# Patient Record
Sex: FEMALE | Race: AMERICAN INDIAN OR ALASKA NATIVE | NOT HISPANIC OR LATINO | URBAN - METROPOLITAN AREA
[De-identification: names, ages, dates, MRNs, and addresses within clinical notes are randomized per-mention and may not be internally consistent; named-entity substitution may affect disease eponyms.]

---
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
## 2023-02-24 ENCOUNTER — DOCUMENTATION (OUTPATIENT)
Dept: LABOR AND DELIVERY | Facility: HOSPITAL | Age: 18
End: 2023-02-24

## 2023-02-24 DIAGNOSIS — N94.6 DYSMENORRHEA: Primary | ICD-10-CM

## 2023-02-24 RX ORDER — LEVONORGESTREL AND ETHINYL ESTRADIOL 0.15-0.03
1 KIT ORAL DAILY
Qty: 28 TABLET | Refills: 11 | Status: SHIPPED | OUTPATIENT
Start: 2023-02-24 | End: 2024-02-24

## 2023-03-17 ENCOUNTER — DOCUMENTATION (OUTPATIENT)
Dept: LABOR AND DELIVERY | Facility: HOSPITAL | Age: 18
End: 2023-03-17

## 2023-03-17 DIAGNOSIS — N92.0 MENORRHAGIA WITH REGULAR CYCLE: Primary | ICD-10-CM

## 2023-03-17 RX ORDER — NORGESTIMATE AND ETHINYL ESTRADIOL 0.25-0.035
1 KIT ORAL DAILY
Qty: 28 TABLET | Refills: 11 | Status: SHIPPED | OUTPATIENT
Start: 2023-03-17

## 2024-03-04 NOTE — ED PEDIATRIC NURSE NOTE - NS ED NURSE LEVEL OF CONSCIOUSNESS MENTAL STATUS
Awake/Alert/Cooperative

## 2024-03-14 ENCOUNTER — EMERGENCY (EMERGENCY)
Facility: HOSPITAL | Age: 19
LOS: 1 days | Discharge: HOME | End: 2024-03-14
Attending: EMERGENCY MEDICINE
Payer: MEDICAID

## 2024-03-14 VITALS
WEIGHT: 115.08 LBS | RESPIRATION RATE: 18 BRPM | TEMPERATURE: 98 F | HEIGHT: 63 IN | SYSTOLIC BLOOD PRESSURE: 99 MMHG | HEART RATE: 72 BPM | OXYGEN SATURATION: 99 % | DIASTOLIC BLOOD PRESSURE: 64 MMHG

## 2024-03-14 VITALS
HEART RATE: 63 BPM | RESPIRATION RATE: 18 BRPM | SYSTOLIC BLOOD PRESSURE: 100 MMHG | DIASTOLIC BLOOD PRESSURE: 56 MMHG | TEMPERATURE: 98 F | OXYGEN SATURATION: 100 %

## 2024-03-14 LAB
ALBUMIN SERPL ELPH-MCNC: 4.1 G/DL — SIGNIFICANT CHANGE UP (ref 3.5–5)
ALP SERPL-CCNC: 50 U/L — SIGNIFICANT CHANGE UP (ref 40–120)
ALT FLD-CCNC: 18 U/L DA — SIGNIFICANT CHANGE UP (ref 10–60)
ANION GAP SERPL CALC-SCNC: 3 MMOL/L — LOW (ref 5–17)
ANISOCYTOSIS BLD QL: SIGNIFICANT CHANGE UP
APPEARANCE UR: CLEAR — SIGNIFICANT CHANGE UP
AST SERPL-CCNC: 13 U/L — SIGNIFICANT CHANGE UP (ref 10–40)
BASOPHILS # BLD AUTO: 0.05 K/UL — SIGNIFICANT CHANGE UP (ref 0–0.2)
BASOPHILS NFR BLD AUTO: 0.4 % — SIGNIFICANT CHANGE UP (ref 0–2)
BILIRUB SERPL-MCNC: 1.1 MG/DL — SIGNIFICANT CHANGE UP (ref 0.2–1.2)
BILIRUB UR-MCNC: NEGATIVE — SIGNIFICANT CHANGE UP
BUN SERPL-MCNC: 11 MG/DL — SIGNIFICANT CHANGE UP (ref 7–18)
CALCIUM SERPL-MCNC: 8.9 MG/DL — SIGNIFICANT CHANGE UP (ref 8.4–10.5)
CHLORIDE SERPL-SCNC: 106 MMOL/L — SIGNIFICANT CHANGE UP (ref 96–108)
CO2 SERPL-SCNC: 30 MMOL/L — SIGNIFICANT CHANGE UP (ref 22–31)
COLOR SPEC: YELLOW — SIGNIFICANT CHANGE UP
CREAT SERPL-MCNC: 0.58 MG/DL — SIGNIFICANT CHANGE UP (ref 0.5–1.3)
DIFF PNL FLD: NEGATIVE — SIGNIFICANT CHANGE UP
EGFR: 133 ML/MIN/1.73M2 — SIGNIFICANT CHANGE UP
ELLIPTOCYTES BLD QL SMEAR: SLIGHT — SIGNIFICANT CHANGE UP
EOSINOPHIL # BLD AUTO: 0.03 K/UL — SIGNIFICANT CHANGE UP (ref 0–0.5)
EOSINOPHIL NFR BLD AUTO: 0.3 % — SIGNIFICANT CHANGE UP (ref 0–6)
GIANT PLATELETS BLD QL SMEAR: PRESENT — SIGNIFICANT CHANGE UP
GLUCOSE SERPL-MCNC: 100 MG/DL — HIGH (ref 70–99)
GLUCOSE UR QL: NEGATIVE MG/DL — SIGNIFICANT CHANGE UP
HCG SERPL-ACNC: <1 MIU/ML — SIGNIFICANT CHANGE UP
HCT VFR BLD CALC: 33.9 % — LOW (ref 34.5–45)
HGB BLD-MCNC: 10.4 G/DL — LOW (ref 11.5–15.5)
IMM GRANULOCYTES NFR BLD AUTO: 0.4 % — SIGNIFICANT CHANGE UP (ref 0–0.9)
KETONES UR-MCNC: NEGATIVE MG/DL — SIGNIFICANT CHANGE UP
LEUKOCYTE ESTERASE UR-ACNC: NEGATIVE — SIGNIFICANT CHANGE UP
LG PLATELETS BLD QL AUTO: SLIGHT — SIGNIFICANT CHANGE UP
LIDOCAIN IGE QN: 33 U/L — SIGNIFICANT CHANGE UP (ref 13–75)
LYMPHOCYTES # BLD AUTO: 2.4 K/UL — SIGNIFICANT CHANGE UP (ref 1–3.3)
LYMPHOCYTES # BLD AUTO: 20.1 % — SIGNIFICANT CHANGE UP (ref 13–44)
MANUAL SMEAR VERIFICATION: SIGNIFICANT CHANGE UP
MCHC RBC-ENTMCNC: 19.5 PG — LOW (ref 27–34)
MCHC RBC-ENTMCNC: 30.7 GM/DL — LOW (ref 32–36)
MCV RBC AUTO: 63.6 FL — LOW (ref 80–100)
MICROCYTES BLD QL: SIGNIFICANT CHANGE UP
MONOCYTES # BLD AUTO: 0.59 K/UL — SIGNIFICANT CHANGE UP (ref 0–0.9)
MONOCYTES NFR BLD AUTO: 4.9 % — SIGNIFICANT CHANGE UP (ref 2–14)
NEUTROPHILS # BLD AUTO: 8.84 K/UL — HIGH (ref 1.8–7.4)
NEUTROPHILS NFR BLD AUTO: 73.9 % — SIGNIFICANT CHANGE UP (ref 43–77)
NITRITE UR-MCNC: NEGATIVE — SIGNIFICANT CHANGE UP
NRBC # BLD: 0 /100 WBCS — SIGNIFICANT CHANGE UP (ref 0–0)
OVALOCYTES BLD QL SMEAR: SLIGHT — SIGNIFICANT CHANGE UP
PH UR: 8.5 (ref 5–8)
PLAT MORPH BLD: NORMAL — SIGNIFICANT CHANGE UP
PLATELET # BLD AUTO: 181 K/UL — SIGNIFICANT CHANGE UP (ref 150–400)
POIKILOCYTOSIS BLD QL AUTO: SIGNIFICANT CHANGE UP
POTASSIUM SERPL-MCNC: 3.3 MMOL/L — LOW (ref 3.5–5.3)
POTASSIUM SERPL-SCNC: 3.3 MMOL/L — LOW (ref 3.5–5.3)
PROT SERPL-MCNC: 7.7 G/DL — SIGNIFICANT CHANGE UP (ref 6–8.3)
PROT UR-MCNC: NEGATIVE MG/DL — SIGNIFICANT CHANGE UP
RBC # BLD: 5.33 M/UL — HIGH (ref 3.8–5.2)
RBC # FLD: 15.5 % — HIGH (ref 10.3–14.5)
RBC BLD AUTO: ABNORMAL
SCHISTOCYTES BLD QL AUTO: SLIGHT — SIGNIFICANT CHANGE UP
SODIUM SERPL-SCNC: 139 MMOL/L — SIGNIFICANT CHANGE UP (ref 135–145)
SP GR SPEC: 1.01 — SIGNIFICANT CHANGE UP (ref 1–1.03)
TROPONIN I, HIGH SENSITIVITY RESULT: <3 NG/L — SIGNIFICANT CHANGE UP
UROBILINOGEN FLD QL: 0.2 MG/DL — SIGNIFICANT CHANGE UP (ref 0.2–1)
WBC # BLD: 11.96 K/UL — HIGH (ref 3.8–10.5)
WBC # FLD AUTO: 11.96 K/UL — HIGH (ref 3.8–10.5)

## 2024-03-14 PROCEDURE — 84702 CHORIONIC GONADOTROPIN TEST: CPT

## 2024-03-14 PROCEDURE — 83690 ASSAY OF LIPASE: CPT

## 2024-03-14 PROCEDURE — 84484 ASSAY OF TROPONIN QUANT: CPT

## 2024-03-14 PROCEDURE — 80053 COMPREHEN METABOLIC PANEL: CPT

## 2024-03-14 PROCEDURE — 99285 EMERGENCY DEPT VISIT HI MDM: CPT

## 2024-03-14 PROCEDURE — 36415 COLL VENOUS BLD VENIPUNCTURE: CPT

## 2024-03-14 PROCEDURE — 85025 COMPLETE CBC W/AUTO DIFF WBC: CPT

## 2024-03-14 PROCEDURE — 93010 ELECTROCARDIOGRAM REPORT: CPT

## 2024-03-14 PROCEDURE — 93005 ELECTROCARDIOGRAM TRACING: CPT

## 2024-03-14 PROCEDURE — 71045 X-RAY EXAM CHEST 1 VIEW: CPT | Mod: 26

## 2024-03-14 PROCEDURE — 99285 EMERGENCY DEPT VISIT HI MDM: CPT | Mod: 25

## 2024-03-14 PROCEDURE — 71045 X-RAY EXAM CHEST 1 VIEW: CPT

## 2024-03-14 PROCEDURE — 96375 TX/PRO/DX INJ NEW DRUG ADDON: CPT

## 2024-03-14 PROCEDURE — 81003 URINALYSIS AUTO W/O SCOPE: CPT

## 2024-03-14 PROCEDURE — 96374 THER/PROPH/DIAG INJ IV PUSH: CPT

## 2024-03-14 RX ORDER — ONDANSETRON 8 MG/1
4 TABLET, FILM COATED ORAL ONCE
Refills: 0 | Status: COMPLETED | OUTPATIENT
Start: 2024-03-14 | End: 2024-03-14

## 2024-03-14 RX ORDER — SUCRALFATE 1 G
1 TABLET ORAL ONCE
Refills: 0 | Status: COMPLETED | OUTPATIENT
Start: 2024-03-14 | End: 2024-03-14

## 2024-03-14 RX ORDER — SODIUM CHLORIDE 9 MG/ML
1000 INJECTION INTRAMUSCULAR; INTRAVENOUS; SUBCUTANEOUS ONCE
Refills: 0 | Status: COMPLETED | OUTPATIENT
Start: 2024-03-14 | End: 2024-03-14

## 2024-03-14 RX ORDER — OMEPRAZOLE 10 MG/1
1 CAPSULE, DELAYED RELEASE ORAL
Qty: 14 | Refills: 0
Start: 2024-03-14 | End: 2024-03-27

## 2024-03-14 RX ORDER — ONDANSETRON 8 MG/1
1 TABLET, FILM COATED ORAL
Qty: 6 | Refills: 0
Start: 2024-03-14 | End: 2024-03-15

## 2024-03-14 RX ORDER — FAMOTIDINE 10 MG/ML
20 INJECTION INTRAVENOUS ONCE
Refills: 0 | Status: COMPLETED | OUTPATIENT
Start: 2024-03-14 | End: 2024-03-14

## 2024-03-14 RX ADMIN — Medication 1 GRAM(S): at 14:26

## 2024-03-14 RX ADMIN — ONDANSETRON 4 MILLIGRAM(S): 8 TABLET, FILM COATED ORAL at 13:02

## 2024-03-14 RX ADMIN — FAMOTIDINE 20 MILLIGRAM(S): 10 INJECTION INTRAVENOUS at 13:02

## 2024-03-14 RX ADMIN — SODIUM CHLORIDE 1000 MILLILITER(S): 9 INJECTION INTRAMUSCULAR; INTRAVENOUS; SUBCUTANEOUS at 13:01

## 2024-03-14 RX ADMIN — Medication 30 MILLILITER(S): at 13:02

## 2024-03-14 NOTE — ED PROVIDER NOTE - OBJECTIVE STATEMENT
18-year-old female,  history of ALBARO, presents for evaluation of chest pain, epigastric pain, vomiting since today.  This started while she was at school.  At first epigastric sharp pain and burning sensation.  Followed by vomiting twice NBNB which followed by the midsternal chest tightness and pain.  Pain is continuous.  No alleviating or aggravating factors.   Denies any radiation of pain to the flank area or to the lower abdomen.  Denies any BRBPR or melena history.  No drug or alcohol abuse.  No history of GERD.  No urinary symptoms, vaginal bleeding or any other complaints.

## 2024-03-14 NOTE — ED PROVIDER NOTE - NSFOLLOWUPINSTRUCTIONS_ED_ALL_ED_FT
For the next week avoid any foods that can irritate your stomach: Caffeine, chocolate, mint, citrus, spicy food, fried food, milk.  Follow up with the primary care doctor in 2-3 days.  If you experience any new or worsening symptoms or if you are concerned you can always come back to the emergency for a re-evaluation.  Some results may not be available at the time of your discharge from the hospital. You can download the FOLLOW MY HEALTH silke and have access to these results.  If there were any prescriptions given to you during the visit today take them as prescribed. If you have any questions you can ask the pharmacist.

## 2024-03-14 NOTE — ED ADULT NURSE NOTE - NSFALLUNIVINTERV_ED_ALL_ED
Bed/Stretcher in lowest position, wheels locked, appropriate side rails in place/Call bell, personal items and telephone in reach/Instruct patient to call for assistance before getting out of bed/chair/stretcher/Non-slip footwear applied when patient is off stretcher/[place] to call system/Physically safe environment - no spills, clutter or unnecessary equipment/Purposeful proactive rounding/Room/bathroom lighting operational, light cord in reach

## 2024-03-14 NOTE — ED PROVIDER NOTE - INTERPRETATION
DR. 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 He is on Coumadin for atrial fibrillation. His most recent hemoglobin A1c was 6.4 in July 2018. He is on Zoloft for depression. There are no other complaints at this time. ALLERGIES:   Allergies   Allergen Reactions    Percocet [Oxycodone-Acetaminophen] Itching and Rash     Also causes shaking    Ampicillin Rash       MEDICATIONS: As noted on the [facility] MAR, referenced and incorporated herein.     PAST MEDICAL HISTORY:   Past Medical History:   Diagnosis Date    A-fib ([address] 08/29/2017    new onset    Anemia 4/27/2016    Arthritis     Bacteremia 11/11/2016    Benign prostatic hypertrophy     Chronic kidney disease 10/17/2016    Closed fracture of forearm 8/13/2013    Broken lft forearm     Controlled type 2 diabetes mellitus with chronic kidney disease on chronic dialysis, with long-term current use of insulin ([address]     Decubital ulcer     NON OPEN BUTTOCKS    Dementia     memory problems    Dialysis patient ([address] 1/3/2017    [facility]es Tue, Thurs, [facility]    Difficult intravenous access     HAS NEEDED PICC TEAM IN THE PAST    Difficulty walking     WHEELCHAIR BOUND-PIVOTS WITH ASSIST O F 2    DJD (degenerative joint disease) of knee     Elbow, forearm, and wrist, abrasion or friction burn, without mention of infection 8/13/2013    Abrasions lft forearm     Encephalopathy acute 4/27/2016    Encounter regarding vascular access for dialysis for ESRD ([address] 2/2/2017    ESRD (end stage renal disease) on dialysis ([address] 1/17/2017   
abnormal

## 2024-03-14 NOTE — ED PROVIDER NOTE - PATIENT PORTAL LINK FT
You can access the FollowMyHealth Patient Portal offered by [facility] by registering at the following website: [email or web address]. By joining [employer]’s FollowMyHealth portal, you will also be able to view your health information using other applications (apps) compatible with our system.

## 2024-03-14 NOTE — ED ADULT TRIAGE NOTE - CHIEF COMPLAINT QUOTE
Patient reports epigastric pain radiating to mid-chest associated with nausea and vomiting x2 started 45 mins ago. Accucheck 139. no

## 2024-03-14 NOTE — ED PROVIDER NOTE - CLINICAL SUMMARY MEDICAL DECISION MAKING FREE TEXT BOX
18-year-old female, presents with a chief complaint epigastric pain, chest pain, vomiting today.  Initially patient appears uncomfortable, no signs of distress.  EKG shows sinus bradycardia at 59 bpm without any ischemic or arrhythmic changes.  Abdomen soft, nondistended with mild epigastric tenderness.  Chest x-ray shows no free air or infectious process.  Labs grossly unremarkable.  Trope negative.  Patient felt significant improvement of symptoms after GI cocktail.  Symptoms most likely related to mild esophagitis/GERD/gastritis.  Discussed diet modification, will give Rx for omeprazole/Maalox and patient should follow-up with PMD in 2-3 days.  Discussed red flags to come back to the hospital.

## 2024-03-14 NOTE — ED ADULT NURSE NOTE - CHIEF COMPLAINT QUOTE
Patient reports epigastric pain radiating to mid-chest associated with nausea and vomiting x2 started 45 mins ago. Accucheck 139.

## 2024-04-04 ENCOUNTER — APPOINTMENT (OUTPATIENT)
Dept: OBGYN | Facility: CLINIC | Age: 19
End: 2024-04-04

## 2024-05-23 NOTE — ED ADULT NURSE NOTE - NS ED NOTE ABUSE SUSPICION NEGLECT YN
They insurance will not cover the qvar and it will not cover the pulmicort flexhaler. I left a message with mother that dr briscoe was out and will be back in office on Tuesday.  No

## 2024-11-22 ENCOUNTER — EMERGENCY (EMERGENCY)
Facility: HOSPITAL | Age: 19
LOS: 1 days | Discharge: HOME | End: 2024-11-22
Attending: EMERGENCY MEDICINE | Admitting: EMERGENCY MEDICINE
Payer: MEDICAID

## 2024-11-22 VITALS
DIASTOLIC BLOOD PRESSURE: 66 MMHG | OXYGEN SATURATION: 100 % | HEIGHT: 61 IN | RESPIRATION RATE: 20 BRPM | TEMPERATURE: 98 F | WEIGHT: 102.96 LBS | HEART RATE: 84 BPM | SYSTOLIC BLOOD PRESSURE: 104 MMHG

## 2024-11-22 VITALS
HEART RATE: 70 BPM | TEMPERATURE: 98 F | OXYGEN SATURATION: 98 % | RESPIRATION RATE: 16 BRPM | DIASTOLIC BLOOD PRESSURE: 60 MMHG | SYSTOLIC BLOOD PRESSURE: 98 MMHG

## 2024-11-22 PROBLEM — Z00.00 ENCOUNTER FOR PREVENTIVE HEALTH EXAMINATION: Status: ACTIVE | Noted: 2024-11-22

## 2024-11-22 LAB
ALBUMIN SERPL ELPH-MCNC: 3.6 G/DL — SIGNIFICANT CHANGE UP (ref 3.3–5)
ALP SERPL-CCNC: 36 U/L — SIGNIFICANT CHANGE UP (ref 30–120)
ALT FLD-CCNC: 19 U/L — SIGNIFICANT CHANGE UP (ref 10–60)
ANION GAP SERPL CALC-SCNC: 9 MMOL/L — SIGNIFICANT CHANGE UP (ref 5–17)
ANISOCYTOSIS BLD QL: SLIGHT — SIGNIFICANT CHANGE UP
APPEARANCE UR: ABNORMAL
AST SERPL-CCNC: 12 U/L — SIGNIFICANT CHANGE UP (ref 10–40)
BACTERIA # UR AUTO: NEGATIVE /HPF — SIGNIFICANT CHANGE UP
BASOPHILS # BLD AUTO: 0.03 K/UL — SIGNIFICANT CHANGE UP (ref 0–0.2)
BASOPHILS NFR BLD AUTO: 0.5 % — SIGNIFICANT CHANGE UP (ref 0–2)
BILIRUB SERPL-MCNC: 0.8 MG/DL — SIGNIFICANT CHANGE UP (ref 0.2–1.2)
BILIRUB UR-MCNC: NEGATIVE — SIGNIFICANT CHANGE UP
BUN SERPL-MCNC: 11 MG/DL — SIGNIFICANT CHANGE UP (ref 7–23)
CALCIUM SERPL-MCNC: 9.1 MG/DL — SIGNIFICANT CHANGE UP (ref 8.4–10.5)
CHLORIDE SERPL-SCNC: 106 MMOL/L — SIGNIFICANT CHANGE UP (ref 96–108)
CO2 SERPL-SCNC: 29 MMOL/L — SIGNIFICANT CHANGE UP (ref 22–31)
COLOR SPEC: YELLOW — SIGNIFICANT CHANGE UP
COMMENT - URINE: SIGNIFICANT CHANGE UP
CREAT SERPL-MCNC: 0.58 MG/DL — SIGNIFICANT CHANGE UP (ref 0.5–1.3)
DIFF PNL FLD: ABNORMAL
EGFR: 132 ML/MIN/1.73M2 — SIGNIFICANT CHANGE UP
ELLIPTOCYTES BLD QL SMEAR: SLIGHT — SIGNIFICANT CHANGE UP
EOSINOPHIL # BLD AUTO: 0.06 K/UL — SIGNIFICANT CHANGE UP (ref 0–0.5)
EOSINOPHIL NFR BLD AUTO: 0.9 % — SIGNIFICANT CHANGE UP (ref 0–6)
EPI CELLS # UR: PRESENT
GLUCOSE SERPL-MCNC: 109 MG/DL — HIGH (ref 70–99)
GLUCOSE UR QL: NEGATIVE MG/DL — SIGNIFICANT CHANGE UP
HCG UR QL: NEGATIVE — SIGNIFICANT CHANGE UP
HCT VFR BLD CALC: 30.5 % — LOW (ref 34.5–45)
HGB BLD-MCNC: 9.4 G/DL — LOW (ref 11.5–15.5)
IMM GRANULOCYTES NFR BLD AUTO: 0.3 % — SIGNIFICANT CHANGE UP (ref 0–0.9)
KETONES UR-MCNC: ABNORMAL MG/DL
LEUKOCYTE ESTERASE UR-ACNC: NEGATIVE — SIGNIFICANT CHANGE UP
LIDOCAIN IGE QN: 49 U/L — SIGNIFICANT CHANGE UP (ref 16–77)
LYMPHOCYTES # BLD AUTO: 3.68 K/UL — HIGH (ref 1–3.3)
LYMPHOCYTES # BLD AUTO: 55.8 % — HIGH (ref 13–44)
MCHC RBC-ENTMCNC: 19.6 PG — LOW (ref 27–34)
MCHC RBC-ENTMCNC: 30.8 G/DL — LOW (ref 32–36)
MCV RBC AUTO: 63.5 FL — LOW (ref 80–100)
MICROCYTES BLD QL: SLIGHT — SIGNIFICANT CHANGE UP
MONOCYTES # BLD AUTO: 0.46 K/UL — SIGNIFICANT CHANGE UP (ref 0–0.9)
MONOCYTES NFR BLD AUTO: 7 % — SIGNIFICANT CHANGE UP (ref 2–14)
NEUTROPHILS # BLD AUTO: 2.34 K/UL — SIGNIFICANT CHANGE UP (ref 1.8–7.4)
NEUTROPHILS NFR BLD AUTO: 35.5 % — LOW (ref 43–77)
NITRITE UR-MCNC: NEGATIVE — SIGNIFICANT CHANGE UP
NRBC # BLD: 0 /100 WBCS — SIGNIFICANT CHANGE UP (ref 0–0)
OVALOCYTES BLD QL SMEAR: SLIGHT — SIGNIFICANT CHANGE UP
PH UR: 7.5 — SIGNIFICANT CHANGE UP (ref 5–8)
PLAT MORPH BLD: NORMAL — SIGNIFICANT CHANGE UP
PLATELET # BLD AUTO: 161 K/UL — SIGNIFICANT CHANGE UP (ref 150–400)
PLATELET COUNT - ESTIMATE: NORMAL — SIGNIFICANT CHANGE UP
POIKILOCYTOSIS BLD QL AUTO: SLIGHT — SIGNIFICANT CHANGE UP
POLYCHROMASIA BLD QL SMEAR: SLIGHT — SIGNIFICANT CHANGE UP
POTASSIUM SERPL-MCNC: 3.7 MMOL/L — SIGNIFICANT CHANGE UP (ref 3.5–5.3)
POTASSIUM SERPL-SCNC: 3.7 MMOL/L — SIGNIFICANT CHANGE UP (ref 3.5–5.3)
PROT SERPL-MCNC: 6.8 G/DL — SIGNIFICANT CHANGE UP (ref 6–8.3)
PROT UR-MCNC: NEGATIVE MG/DL — SIGNIFICANT CHANGE UP
RBC # BLD: 4.8 M/UL — SIGNIFICANT CHANGE UP (ref 3.8–5.2)
RBC # FLD: 17.6 % — HIGH (ref 10.3–14.5)
RBC BLD AUTO: ABNORMAL
RBC CASTS # UR COMP ASSIST: 20 /HPF — HIGH (ref 0–4)
SODIUM SERPL-SCNC: 144 MMOL/L — SIGNIFICANT CHANGE UP (ref 135–145)
SP GR SPEC: 1.02 — SIGNIFICANT CHANGE UP (ref 1–1.03)
UROBILINOGEN FLD QL: 1 MG/DL — SIGNIFICANT CHANGE UP (ref 0.2–1)
WBC # BLD: 6.59 K/UL — SIGNIFICANT CHANGE UP (ref 3.8–10.5)
WBC # FLD AUTO: 6.59 K/UL — SIGNIFICANT CHANGE UP (ref 3.8–10.5)
WBC UR QL: 1 /HPF — SIGNIFICANT CHANGE UP (ref 0–5)

## 2024-11-22 RX ORDER — KETOROLAC TROMETHAMINE 30 MG/ML
30 INJECTION INTRAMUSCULAR; INTRAVENOUS ONCE
Refills: 0 | Status: DISCONTINUED | OUTPATIENT
Start: 2024-11-22 | End: 2024-11-22

## 2024-11-22 RX ORDER — SODIUM CHLORIDE 9 MG/ML
1000 INJECTION, SOLUTION INTRAMUSCULAR; INTRAVENOUS; SUBCUTANEOUS ONCE
Refills: 0 | Status: COMPLETED | OUTPATIENT
Start: 2024-11-22 | End: 2024-11-22

## 2024-11-22 RX ORDER — MORPHINE SULFATE 30 MG/1
4 TABLET, EXTENDED RELEASE ORAL ONCE
Refills: 0 | Status: DISCONTINUED | OUTPATIENT
Start: 2024-11-22 | End: 2024-11-22

## 2024-11-22 RX ORDER — ONDANSETRON HYDROCHLORIDE 2 MG/ML
4 INJECTION, SOLUTION INTRAMUSCULAR; INTRAVENOUS ONCE
Refills: 0 | Status: COMPLETED | OUTPATIENT
Start: 2024-11-22 | End: 2024-11-22

## 2024-11-22 RX ADMIN — ONDANSETRON HYDROCHLORIDE 4 MILLIGRAM(S): 2 INJECTION, SOLUTION INTRAMUSCULAR; INTRAVENOUS at 03:36

## 2024-11-22 RX ADMIN — SODIUM CHLORIDE 1000 MILLILITER(S): 9 INJECTION, SOLUTION INTRAMUSCULAR; INTRAVENOUS; SUBCUTANEOUS at 05:00

## 2024-11-22 RX ADMIN — MORPHINE SULFATE 4 MILLIGRAM(S): 30 TABLET, EXTENDED RELEASE ORAL at 04:30

## 2024-11-22 RX ADMIN — KETOROLAC TROMETHAMINE 30 MILLIGRAM(S): 30 INJECTION INTRAMUSCULAR; INTRAVENOUS at 04:56

## 2024-11-22 RX ADMIN — MORPHINE SULFATE 4 MILLIGRAM(S): 30 TABLET, EXTENDED RELEASE ORAL at 03:36

## 2024-11-22 RX ADMIN — SODIUM CHLORIDE 1000 MILLILITER(S): 9 INJECTION, SOLUTION INTRAMUSCULAR; INTRAVENOUS; SUBCUTANEOUS at 03:37

## 2024-11-22 RX ADMIN — KETOROLAC TROMETHAMINE 30 MILLIGRAM(S): 30 INJECTION INTRAMUSCULAR; INTRAVENOUS at 05:30

## 2024-11-22 NOTE — ED ADULT NURSE NOTE - OBJECTIVE STATEMENT
Pt presents to the ED with reports of right sided abdominal pain which began around 4 weeks ago. Pt was seen by GI 4 days ago, had blood test and urine according to pt, was not prescribed any meds. Pt states the pain is worse tonight and she vomited 3 times.

## 2024-11-22 NOTE — ED PROVIDER NOTE - DATE/TIME 1
22-Nov-2024 04:38

## 2024-11-22 NOTE — ED PROVIDER NOTE - CARE PROVIDER_API CALL
Keegan Johnson  Gastroenterology  [address] [place] [id number]  Phone: [phone]  Fax: [phone]  Follow Up Time:

## 2024-11-22 NOTE — ED PROVIDER NOTE - PHYSICAL EXAMINATION
Gen: Alert, NAD  Head/eyes: NC/AT, PERRL  ENT: airway patent  Neck: supple  Pulm/lung: Bilateral clear BS  CV/heart: RRR  GI/Abd: soft, +mild ttp ruq and rlq/ND, +BS, no guarding/rebound tenderness  Musculoskeletal: no edema/erythema/cyanosis  Skin: no rash  Neuro: AAOx3, grossly intact

## 2024-11-22 NOTE — ED PROVIDER NOTE - CLINICAL SUMMARY MEDICAL DECISION MAKING FREE TEXT BOX
19-year-old female no significant past medical history complain about 1 month of right-sided abdominal pain has been intermittent twisting sensation states usually occurs at night but tonight got a lot worse with associated nausea vomiting x 3.  Denies any fever or chills.  No history of abdominal surgeries.    r/o infection, appendicitis, labs, CT a/p, IV analgesia, antiemetics 19-year-old female no significant past medical history complain about 1 month of right-sided upper abdominal pain has been intermittent twisting sensation states usually occurs at night but tonight got a lot worse with associated nausea vomiting x 3.  Denies any fever or chills.  No history of abdominal surgeries.      r/o infection, appendicitis, labs, CT a/p, IV analgesia, antiemetics

## 2024-11-22 NOTE — ED ADULT TRIAGE NOTE - CHIEF COMPLAINT QUOTE
c/o right abd pain x1 month. "Feels like needles stabbing me". Took Ibuprofen twice over the month. Also c/o n/v this past hour. hx severe anemia.

## 2024-11-22 NOTE — ED PROVIDER NOTE - NSFOLLOWUPINSTRUCTIONS_ED_ALL_ED_FT
Return to the ED for any new or worsening symptoms  Take your medication as prescribed  Follow-up with your hematologist and gynecologist as scheduled  If you continue to experience right upper quadrant pain consider follow-up with a gastroenterologist  Advance activity as tolerated      Abdominal Pain    WHAT YOU NEED TO KNOW:    Abdominal pain can be dull, achy, or sharp. You may have pain in one area of your abdomen, or in your entire abdomen. Your pain may be caused by a condition such as constipation, food sensitivity or poisoning, infection, or a blockage. Abdominal pain can also be from a hernia, appendicitis, or an ulcer. Liver, gallbladder, or kidney conditions can also cause abdominal pain. The cause of your abdominal pain may not be known.  Abdominal Organs    DISCHARGE INSTRUCTIONS:    Call your local emergency number (911 in the [place]) if:    You have chest pain or shortness of breath.    Seek care immediately if:    You have pulsing pain in your upper abdomen or lower back that suddenly becomes constant.    Your pain is in the right lower abdominal area and worsens with movement.    You have a fever over 100.4°F (38°C) or shaking chills.    You are vomiting and cannot keep food or liquids down.    Your pain does not improve or gets worse over the next 8 to 12 hours.    You see blood in your vomit or bowel movements, or they look black and tarry.    Your skin or the whites of your eyes turn yellow.    You are a woman and have a large amount of vaginal bleeding that is not your monthly period.  Call your doctor if:    You have pain in your lower back.    You are a man and have pain in your testicles.    You have pain when you urinate.    You have questions or concerns about your condition or care.  Medicines: You may need any of the following:    Medicines may be given to calm your stomach or prevent vomiting.    Prescription pain medicine may be given. Ask your healthcare provider how to take this medicine safely. Some prescription pain medicines contain acetaminophen. Do not take other medicines that contain acetaminophen without talking to your healthcare provider. Too much acetaminophen may cause liver damage. Prescription pain medicine may cause constipation. Ask your healthcare provider how to prevent or treat constipation.    Take your medicine as directed. Contact your healthcare provider if you think your medicine is not helping or if you have side effects. Tell your provider if you are allergic to any medicine. Keep a list of the medicines, vitamins, and herbs you take. Include the amounts, and when and why you take them. Bring the list or the pill bottles to follow-up visits. Carry your medicine list with you in case of an emergency.  Manage or prevent abdominal pain:    Apply heat on your abdomen for 20 to 30 minutes every 2 hours for as many days as directed. Heat helps decrease pain and muscle spasms.    Make changes to the foods you eat, if needed. Do not eat foods that cause abdominal pain or other symptoms. Eat small meals more often. The following changes may also help:  Eat more high-fiber foods if you are constipated. High-fiber foods include fruits, vegetables, whole-grain foods, and legumes such as mosley beans.        Do not eat foods that cause gas if you have bloating. Examples include broccoli, cabbage, beans, and carbonated drinks.    Do not eat foods or drinks that contain sorbitol or fructose if you have diarrhea and bloating. Some examples are fruit juices, candy, jelly, and sugar-free gum.    Do not eat high-fat foods. Examples include fried foods, cheeseburgers, hot dogs, and desserts.    Make changes to the liquids you drink, if needed. Do not drink liquids that cause pain or make it worse, such as orange juice. Drink liquids throughout the day to stay hydrated. The following changes may also help:  Drink more liquids to prevent dehydration from diarrhea or vomiting. Ask your healthcare provider how much liquid to drink each day and which liquids are best for you.    Limit or do not have caffeine. Caffeine may make symptoms such as heartburn or nausea worse.    Limit or do not drink alcohol. Alcohol can make your abdominal pain worse. Ask your healthcare provider if it is okay for you to drink alcohol. Also ask how much is okay for you to drink. A drink of alcohol is 12 ounces of beer, ½ ounce of liquor, or 5 ounces of wine.    Keep a diary of your abdominal pain. A diary may help your healthcare provider learn what is causing your pain. Include when the pain happens, how long it lasts, and what the pain feels like. Write down any other symptoms you have with abdominal pain. Also write down what you eat, and any symptoms you have after you eat.    Manage stress. Stress may cause abdominal pain. Your healthcare provider may recommend relaxation techniques and deep breathing exercises to help decrease your stress. Your healthcare provider may recommend you talk to someone about your stress or anxiety, such as a counselor or a friend. Get plenty of sleep. Exercise regularly.   Family Walking for Exercise      Do not smoke. Nicotine and other chemicals in cigarettes can damage your esophagus and stomach. Ask your healthcare provider for information if you currently smoke and need help to quit. E-cigarettes or smokeless tobacco still contain nicotine. Talk to your healthcare provider before you use these products.  Follow up with your doctor as directed: Write down your questions so you remember to ask them during your visits.

## 2024-11-22 NOTE — ED PROVIDER NOTE - PROGRESS NOTE DETAILS
SUBJ:  Pt. is on BiPAP (alternating with HiFlo NC) 100% FiO2. Denies any active chest pain. Following up for reported ST elevations in leads III, and aVF.    MEDICATIONS  (STANDING):  aspirin enteric coated 81 milliGRAM(s) Oral daily  atorvastatin 80 milliGRAM(s) Oral at bedtime  BACItracin   Ointment 1 Application(s) Topical four times a day  dexAMETHasone  Injectable 6 milliGRAM(s) IV Push daily  diltiazem    Tablet 30 milliGRAM(s) Oral four times a day  heparin  Infusion 900 Unit(s)/Hr (9 mL/Hr) IV Continuous <Continuous>  influenza   Vaccine 0.5 milliLiter(s) IntraMuscular once  lidocaine   Patch 1 Patch Transdermal daily  nafcillin  IVPB 2 Gram(s) IV Intermittent every 4 hours  pantoprazole    Tablet 40 milliGRAM(s) Oral before breakfast  QUEtiapine 50 milliGRAM(s) Oral at bedtime  QUEtiapine 25 milliGRAM(s) Oral at bedtime  sodium chloride 0.9%. 1000 milliLiter(s) (50 mL/Hr) IV Continuous <Continuous>    MEDICATIONS  (PRN):  acetaminophen   Tablet .. 650 milliGRAM(s) Oral every 4 hours PRN Mild Pain (1 - 3)  morphine  - Injectable 1 milliGRAM(s) IV Push every 4 hours PRN Severe Pain (7 - 10)  oxycodone    5 mG/acetaminophen 325 mG 1 Tablet(s) Oral every 4 hours PRN Moderate Pain (4 - 6)  zolpidem 5 milliGRAM(s) Oral at bedtime PRN Insomnia          Vital Signs Last 24 Hrs  T(C): 35.2 (27 Nov 2020 06:00), Max: 36.1 (26 Nov 2020 20:30)  T(F): 95.4 (27 Nov 2020 06:00), Max: 96.9 (26 Nov 2020 20:30)  HR: 83 (27 Nov 2020 10:00) (72 - 83)  BP: 102/61 (27 Nov 2020 10:00) (102/61 - 133/74)  BP(mean): --  RR: 20 (27 Nov 2020 10:00) (20 - 22)  SpO2: 95% (27 Nov 2020 10:00) (91% - 98%)    REVIEW OF SYSTEMS:  CONSTITUTIONAL: No fever, weight loss, or fatigue  CARDIOLOGY: denies syncopal episodes.   RESPIRATORY: denies wheezing.   NEUROLOGICAL: NO weakness, no focal deficits to report.  ENDOCRINOLOGICAL: no recent change in diabetic medications.   GI: no BRBPR, no N,V,diarrhea.    PSYCHIATRY: normal mood and affect  HEENT: no nasal discharge, no ecchymosis  SKIN: no ecchymosis, no breakdown  MUSCULOSKELETAL: Full range of motion x4.        PHYSICAL EXAM:  General Appearance: well appearing, normal for age and gender. 	  Neck: normal JVP, no bruit.   Eyes: No xanthelasma, Extra ocular muscles intact.   Cardiovascular: regular rate and rhythm S1 S2, No JVD, No murmurs, No edema  Respiratory: crackles on auscultation bilaterally  Psychiatry: Alert and oriented x 3, Mood & affect appropriate  Gastrointestinal:  Soft, Non-tender  Skin/Integument: No rashes, No ecchymosis, No cyanosis	  Neurologic: Non-focal  Musculoskeletal/ extremities: Normal range of motion, No clubbing, cyanosis or edema  Vascular: Peripheral pulses palpable 2+ bilaterally  	  TELEMETRY:  none    ECG:  NSR, inferior Q waves (old), less than 1 mm ST elevations in leads III, and aVF    TTE:  TTE Echo Complete w/o Contrast w/ Doppler (11.13.20 @ 11:19)   Summary:   1. Mildly to moderately decreased global left ventricular systolic function.   2. Basal and mid inferior wall, basal inferoseptal segment, mid inferolateral segment, and basal inferolateral segment are abnormal as described above.   3. Mildly increased LV wall thickness.   4. Spectral Doppler shows impaired relaxation pattern of left ventricular myocardial filling (Grade I diastolic dysfunction).   5. LVEF appears visually 40%.   6. Normal left atrial size.   7. Normal right atrial size.   8. Mild mitral valve regurgitation.   9. Mild aortic regurgitation.    PHYSICIAN INTERPRETATION:  Left Ventricle: The left ventricular internal cavity size is normal. Left ventricular wall thickness is mildly increased. Global LV systolic function was mildly to moderately decreased. Spectral Doppler shows impaired relaxation pattern of left ventricular myocardial filling (Grade I diastolic dysfunction). LVEF appears visually 40%.      LV Wall Scoring:  The basaland mid inferior wall, basal inferoseptal segment, and mid  inferolateral segment are akinetic. The basal inferolateral segment is  hypokinetic. All remaining scored segments are normal.    Right Ventricle: Normal right ventricular size and function.  Left Atrium: Normal left atrial size.  Right Atrium: Normal right atrial size.  Pericardium: There is no evidence of pericardial effusion.  Mitral Valve: The mitral valve is normal in structure. Mild mitral valve regurgitation is seen.  Tricuspid Valve: Trivial tricuspid regurgitation is visualized.  Aortic Valve: The aortic valve was not well visualized. No evidence of aortic stenosis. Mild aortic valve regurgitation is seen.  Aorta: The aortic root is normal in size and structure.          LABS:                        11.6   30.27 )-----------( 120      ( 27 Nov 2020 08:05 )             35.1     11-27    130<L>  |  92<L>  |  141<HH>  ----------------------------<  134<H>  5.2<H>   |  21  |  3.6<H>    Ca    7.5<L>      27 Nov 2020 08:05  Phos  6.8     11-27  Mg     3.1     11-27    TPro  4.6<L>  /  Alb  2.5<L>  /  TBili  1.6<H>  /  DBili  x   /  AST  28  /  ALT  6   /  AlkPhos  67  11-27        PTT - ( 27 Nov 2020 08:05 )  PTT:>200.0 sec    I&O's Summary    26 Nov 2020 07:01 - 27 Nov 2020 07:00  --------------------------------------------------------  IN: 617.7 mL / OUT: 1100 mL / NET: -482.3 mL    27 Nov 2020 07:01  -  27 Nov 2020 11:13  --------------------------------------------------------  IN: 306 mL / OUT: 100 mL / NET: 206 mL      BNP  RADIOLOGY & ADDITIONAL STUDIES:           Labs and imaging explained the patient states her hemoglobin improved ever since being on iron infusions.  Made aware of left ovarian cysts with the pelvic free fluid seen on CT.  Explained likely this could have been a ruptured cyst.  States she is feeling better but still having slight discomfort will give Toradol. Results of ultrasound reviewed patient made aware of the 3.2 cm left ovarian cyst right ovary was not visualized due to increased gas patient's abdomen is currently soft nontender she does admit to some vaginal spotting she is on birth control currently reports this is normal.  She has follow-up with her hematologist for her iron deficiency anemia December 3.  Patient is stable for discharge GI referral provided

## 2024-11-22 NOTE — ED PROVIDER NOTE - OBJECTIVE STATEMENT
19-year-old female no significant past medical history complain about 1 month of right-sided abdominal pain has been intermittent twisting sensation states usually occurs at night but tonight got a lot worse with associated nausea vomiting x 3.  Denies any fever or chills.  No history of abdominal surgeries. 19-year-old female no significant past medical history complain about 1 month of right-sided upper abdominal pain has been intermittent twisting sensation states usually occurs at night but tonight got a lot worse with associated nausea vomiting x 3.  Denies any fever or chills.  No history of abdominal surgeries.

## 2024-11-22 NOTE — ED ADULT TRIAGE NOTE - CCCP TRG CHIEF CMPLNT
abdominal pain
Alert and oriented to person, place, time/situation. normal mood and affect. no apparent risk to self or others.

## 2024-11-22 NOTE — ED PROVIDER NOTE - PATIENT PORTAL LINK FT
You can access the FollowMyHealth Patient Portal offered by [facility] by registering at the following website: [email or web address]. By joining [employer]’s FollowMyHealth portal, you will also be able to view your health information using other applications (apps) compatible with our system.

## 2024-12-04 PROBLEM — Z00.00 ENCOUNTER FOR PREVENTIVE HEALTH EXAMINATION: Status: ACTIVE | Noted: 2024-12-04

## 2024-12-11 ENCOUNTER — EMERGENCY (EMERGENCY)
Facility: HOSPITAL | Age: 19
LOS: 1 days | Discharge: HOME | End: 2024-12-11
Attending: EMERGENCY MEDICINE | Admitting: EMERGENCY MEDICINE
Payer: MEDICAID

## 2024-12-11 VITALS
OXYGEN SATURATION: 100 % | DIASTOLIC BLOOD PRESSURE: 74 MMHG | TEMPERATURE: 97 F | SYSTOLIC BLOOD PRESSURE: 106 MMHG | RESPIRATION RATE: 20 BRPM | HEIGHT: 63 IN | WEIGHT: 104.06 LBS | HEART RATE: 83 BPM

## 2024-12-11 VITALS
DIASTOLIC BLOOD PRESSURE: 47 MMHG | HEART RATE: 84 BPM | SYSTOLIC BLOOD PRESSURE: 84 MMHG | RESPIRATION RATE: 15 BRPM | OXYGEN SATURATION: 100 % | TEMPERATURE: 99 F

## 2024-12-11 LAB
ALBUMIN SERPL ELPH-MCNC: 4.3 G/DL — SIGNIFICANT CHANGE UP (ref 3.3–5)
ALP SERPL-CCNC: 38 U/L — SIGNIFICANT CHANGE UP (ref 30–120)
ALT FLD-CCNC: 18 U/L — SIGNIFICANT CHANGE UP (ref 10–60)
ANION GAP SERPL CALC-SCNC: 12 MMOL/L — SIGNIFICANT CHANGE UP (ref 5–17)
APPEARANCE UR: CLEAR — SIGNIFICANT CHANGE UP
AST SERPL-CCNC: 16 U/L — SIGNIFICANT CHANGE UP (ref 10–40)
BASOPHILS # BLD AUTO: 0.03 K/UL — SIGNIFICANT CHANGE UP (ref 0–0.2)
BASOPHILS NFR BLD AUTO: 0.3 % — SIGNIFICANT CHANGE UP (ref 0–2)
BILIRUB SERPL-MCNC: 1.5 MG/DL — HIGH (ref 0.2–1.2)
BILIRUB UR-MCNC: NEGATIVE — SIGNIFICANT CHANGE UP
BUN SERPL-MCNC: 10 MG/DL — SIGNIFICANT CHANGE UP (ref 7–23)
CALCIUM SERPL-MCNC: 10 MG/DL — SIGNIFICANT CHANGE UP (ref 8.4–10.5)
CHLORIDE SERPL-SCNC: 103 MMOL/L — SIGNIFICANT CHANGE UP (ref 96–108)
CO2 SERPL-SCNC: 24 MMOL/L — SIGNIFICANT CHANGE UP (ref 22–31)
COLOR SPEC: YELLOW — SIGNIFICANT CHANGE UP
CREAT SERPL-MCNC: 0.7 MG/DL — SIGNIFICANT CHANGE UP (ref 0.5–1.3)
DIFF PNL FLD: NEGATIVE — SIGNIFICANT CHANGE UP
EGFR: 126 ML/MIN/1.73M2 — SIGNIFICANT CHANGE UP
EGFR: 126 ML/MIN/1.73M2 — SIGNIFICANT CHANGE UP
EOSINOPHIL # BLD AUTO: 0.02 K/UL — SIGNIFICANT CHANGE UP (ref 0–0.5)
EOSINOPHIL NFR BLD AUTO: 0.2 % — SIGNIFICANT CHANGE UP (ref 0–6)
GLUCOSE SERPL-MCNC: 89 MG/DL — SIGNIFICANT CHANGE UP (ref 70–99)
GLUCOSE UR QL: NEGATIVE MG/DL — SIGNIFICANT CHANGE UP
HCG SERPL-ACNC: <1 MIU/ML — SIGNIFICANT CHANGE UP
HCG UR QL: NEGATIVE — SIGNIFICANT CHANGE UP
HCT VFR BLD CALC: 33.3 % — LOW (ref 34.5–45)
HGB BLD-MCNC: 10.5 G/DL — LOW (ref 11.5–15.5)
IMM GRANULOCYTES NFR BLD AUTO: 0.2 % — SIGNIFICANT CHANGE UP (ref 0–0.9)
KETONES UR-MCNC: 15 MG/DL
LEUKOCYTE ESTERASE UR-ACNC: NEGATIVE — SIGNIFICANT CHANGE UP
LIDOCAIN IGE QN: 32 U/L — SIGNIFICANT CHANGE UP (ref 16–77)
LYMPHOCYTES # BLD AUTO: 3.67 K/UL — HIGH (ref 1–3.3)
LYMPHOCYTES # BLD AUTO: 41.7 % — SIGNIFICANT CHANGE UP (ref 13–44)
MCHC RBC-ENTMCNC: 19.6 PG — LOW (ref 27–34)
MCHC RBC-ENTMCNC: 31.5 G/DL — LOW (ref 32–36)
MCV RBC AUTO: 62 FL — LOW (ref 80–100)
MONOCYTES # BLD AUTO: 0.44 K/UL — SIGNIFICANT CHANGE UP (ref 0–0.9)
MONOCYTES NFR BLD AUTO: 5 % — SIGNIFICANT CHANGE UP (ref 2–14)
NEUTROPHILS # BLD AUTO: 4.62 K/UL — SIGNIFICANT CHANGE UP (ref 1.8–7.4)
NEUTROPHILS NFR BLD AUTO: 52.6 % — SIGNIFICANT CHANGE UP (ref 43–77)
NITRITE UR-MCNC: NEGATIVE — SIGNIFICANT CHANGE UP
NRBC # BLD: 0 /100 WBCS — SIGNIFICANT CHANGE UP (ref 0–0)
NRBC BLD-RTO: 0 /100 WBCS — SIGNIFICANT CHANGE UP (ref 0–0)
PH UR: 8 — SIGNIFICANT CHANGE UP (ref 5–8)
PLATELET # BLD AUTO: 181 K/UL — SIGNIFICANT CHANGE UP (ref 150–400)
POTASSIUM SERPL-MCNC: 3.5 MMOL/L — SIGNIFICANT CHANGE UP (ref 3.5–5.3)
POTASSIUM SERPL-SCNC: 3.5 MMOL/L — SIGNIFICANT CHANGE UP (ref 3.5–5.3)
PROT SERPL-MCNC: 7.7 G/DL — SIGNIFICANT CHANGE UP (ref 6–8.3)
PROT UR-MCNC: NEGATIVE MG/DL — SIGNIFICANT CHANGE UP
RBC # BLD: 5.37 M/UL — HIGH (ref 3.8–5.2)
RBC # FLD: 16.4 % — HIGH (ref 10.3–14.5)
SODIUM SERPL-SCNC: 139 MMOL/L — SIGNIFICANT CHANGE UP (ref 135–145)
SP GR SPEC: 1.01 — SIGNIFICANT CHANGE UP (ref 1–1.03)
UROBILINOGEN FLD QL: 0.2 MG/DL — SIGNIFICANT CHANGE UP (ref 0.2–1)
WBC # BLD: 8.8 K/UL — SIGNIFICANT CHANGE UP (ref 3.8–10.5)
WBC # FLD AUTO: 8.8 K/UL — SIGNIFICANT CHANGE UP (ref 3.8–10.5)

## 2024-12-11 RX ORDER — ONDANSETRON HCL/PF 4 MG/2 ML
4 VIAL (ML) INJECTION ONCE
Refills: 0 | Status: COMPLETED | OUTPATIENT
Start: 2024-12-11 | End: 2024-12-11

## 2024-12-11 RX ORDER — ACETAMINOPHEN 500 MG/5ML
700 LIQUID (ML) ORAL ONCE
Refills: 0 | Status: COMPLETED | OUTPATIENT
Start: 2024-12-11 | End: 2024-12-11

## 2024-12-11 RX ORDER — KETOROLAC TROMETHAMINE 30 MG/ML
15 INJECTION, SOLUTION INTRAMUSCULAR; INTRAVENOUS ONCE
Refills: 0 | Status: DISCONTINUED | OUTPATIENT
Start: 2024-12-11 | End: 2024-12-11

## 2024-12-11 RX ADMIN — Medication 1000 MILLILITER(S): at 16:37

## 2024-12-11 RX ADMIN — Medication 4 MILLIGRAM(S): at 17:07

## 2024-12-11 RX ADMIN — KETOROLAC TROMETHAMINE 15 MILLIGRAM(S): 30 INJECTION, SOLUTION INTRAMUSCULAR; INTRAVENOUS at 18:56

## 2024-12-11 RX ADMIN — Medication 700 MILLIGRAM(S): at 21:17

## 2024-12-11 RX ADMIN — Medication 4 MILLIGRAM(S): at 16:37

## 2024-12-11 RX ADMIN — KETOROLAC TROMETHAMINE 15 MILLIGRAM(S): 30 INJECTION, SOLUTION INTRAMUSCULAR; INTRAVENOUS at 21:17

## 2024-12-11 RX ADMIN — Medication 700 MILLIGRAM(S): at 21:16

## 2024-12-11 RX ADMIN — Medication 1000 MILLILITER(S): at 17:37

## 2024-12-11 RX ADMIN — Medication 280 MILLIGRAM(S): at 18:57

## 2024-12-12 ENCOUNTER — APPOINTMENT (OUTPATIENT)
Dept: INTERNAL MEDICINE | Facility: CLINIC | Age: 19
End: 2024-12-12

## 2025-01-28 ENCOUNTER — EMERGENCY (EMERGENCY)
Facility: HOSPITAL | Age: 20
LOS: 1 days | Discharge: HOME | End: 2025-01-28
Attending: STUDENT IN AN ORGANIZED HEALTH CARE EDUCATION/TRAINING PROGRAM | Admitting: STUDENT IN AN ORGANIZED HEALTH CARE EDUCATION/TRAINING PROGRAM
Payer: MEDICAID

## 2025-01-28 VITALS
OXYGEN SATURATION: 98 % | RESPIRATION RATE: 18 BRPM | SYSTOLIC BLOOD PRESSURE: 110 MMHG | TEMPERATURE: 98 F | DIASTOLIC BLOOD PRESSURE: 65 MMHG | HEART RATE: 80 BPM

## 2025-01-28 VITALS — HEART RATE: 82 BPM | TEMPERATURE: 97 F | RESPIRATION RATE: 20 BRPM | WEIGHT: 104.94 LBS | OXYGEN SATURATION: 98 %

## 2025-01-28 PROBLEM — D50.9 IRON DEFICIENCY ANEMIA, UNSPECIFIED: Chronic | Status: ACTIVE | Noted: 2024-03-14

## 2025-01-28 PROBLEM — Z78.9 OTHER SPECIFIED HEALTH STATUS: Chronic | Status: ACTIVE | Noted: 2021-10-18

## 2025-01-28 PROBLEM — Z78.9 OTHER SPECIFIED HEALTH STATUS: Chronic | Status: ACTIVE | Noted: 2022-08-21

## 2025-01-28 PROCEDURE — 99283 EMERGENCY DEPT VISIT LOW MDM: CPT | Mod: 25

## 2025-01-28 PROCEDURE — 99283 EMERGENCY DEPT VISIT LOW MDM: CPT

## 2025-01-28 RX ORDER — LIDOCAINE HYDROCHLORIDE 10 MG/ML
5 INJECTION INFILTRATION; PERINEURAL ONCE
Refills: 0 | Status: COMPLETED | OUTPATIENT
Start: 2025-01-28 | End: 2025-01-28

## 2025-01-28 RX ORDER — ACETAMINOPHEN 80 MG/.8ML
650 SOLUTION/ DROPS ORAL ONCE
Refills: 0 | Status: COMPLETED | OUTPATIENT
Start: 2025-01-28 | End: 2025-01-28

## 2025-01-28 RX ORDER — LIDOCAINE 50 MG/G
1 OINTMENT TOPICAL ONCE
Refills: 0 | Status: DISCONTINUED | OUTPATIENT
Start: 2025-01-28 | End: 2025-01-28

## 2025-01-28 RX ADMIN — ACETAMINOPHEN 650 MILLIGRAM(S): 80 SOLUTION/ DROPS ORAL at 17:23

## 2025-01-28 RX ADMIN — LIDOCAINE HYDROCHLORIDE 5 MILLILITER(S): 10 INJECTION INFILTRATION; PERINEURAL at 19:44

## 2025-01-28 RX ADMIN — ACETAMINOPHEN 650 MILLIGRAM(S): 80 SOLUTION/ DROPS ORAL at 19:44

## 2025-01-28 NOTE — ED PROVIDER NOTE - ATTENDING APP SHARED VISIT CONTRIBUTION OF CARE
Jose ATTG See MDM I performed a history and physical exam of the patient and discussed their management with the Physician assistant reviewed the PAs note and agree with the documented findings and plan of care. My medical decision making and observations are found above.

## 2025-01-28 NOTE — ED PROVIDER NOTE - CLINICAL SUMMARY MEDICAL DECISION MAKING FREE TEXT BOX
Jose ATTG   19-year-old female no reported past medical history presents to the ED with complaint of redness to bilateral arms and upper chest s/p chemical exposure.  Patient reports she was cleaning her house with bleach and Windex, she had gloves on but did not realize the water got in her gloves and her hands were wet.  She touched her arms and her chest, felt her skin started to burn so she went into the shower.  Patient reports she also waxed her bilateral arms earlier this morning.  She denies any paresthesias, motor or sensory deficits or all other complaints    given hx and pe explained to pt to keep area clean, moisturized, appears red and blanches, rec aloe vera
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
## 2025-01-28 NOTE — ED PROVIDER NOTE - OBJECTIVE STATEMENT
19-year-old female no reported past medical history presents to the ED with complaint of redness to bilateral arms and upper chest s/p chemical exposure.  Patient reports she was cleaning her house with bleach and Windex, she had gloves on but did not realize the water got in her gloves and her hands were wet.  She touched her arms and her chest, felt her skin started to burn so she went into the shower.  Patient reports she also waxed her bilateral arms earlier this morning.  She denies any paresthesias, motor or sensory deficits or all other complaints

## 2025-01-28 NOTE — ED PROVIDER NOTE - NSFOLLOWUPINSTRUCTIONS_ED_ALL_ED_FT
Follow up with a primary care physician within 2-3 days.     Apply Aloe Vera to the irritated skin on the arms as discussed     Take over the counter Tylenol or motrin as needed for pain     Stay hydrated    Return to the ER if your symptoms worsen or for any other medical emergencies  **********
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
## 2025-01-28 NOTE — ED PROVIDER NOTE - PATIENT PORTAL LINK FT
You can access the FollowMyHealth Patient Portal offered by [facility] by registering at the following website: [email or web address]. By joining [employer]’s FollowMyHealth portal, you will also be able to view your health information using other applications (apps) compatible with our system.

## 2025-01-28 NOTE — ED PROVIDER NOTE - PHYSICAL EXAMINATION
Gen: Well appearing in NAD.   Head: atraumatic  Heart: s1/s2, RRR  Lung: CTA b/l,  Msk: pt ambulatory   Neuro: AAO x3, patient moving all extremity equally, no focal neuro deficits noted  Skin: +skin irritation with mild erythema to b/l arms and upper chest. No blisters, drainage or open skin wounds   Psych: Calm and cooperative

## 2025-01-28 NOTE — ED ADULT NURSE REASSESSMENT NOTE - NS ED NURSE REASSESS COMMENT FT1
Patient refused BP at triage due to pain to both arms
pt reavaluated and medicated with lidocaine jelly and verbalizes relief from pain dc home to follow up

## 2025-01-28 NOTE — ED PROVIDER NOTE - NSICDXPASTMEDICALHX_GEN_ALL_CORE_FT
PAST MEDICAL HISTORY:  ALBARO (iron deficiency anemia)     No pertinent past medical history     No pertinent past medical history     No pertinent past medical history

## 2025-03-15 NOTE — ED PEDIATRIC NURSE REASSESSMENT NOTE - CONDITION
End of Shift Note    Bedside shift change report given to BRAD Perez (oncoming nurse) by DALLAS BARRON [facility] phone for oncoming shift:   [phone]       DALLAS BARRON RN                             improved

## 2025-07-21 ENCOUNTER — EMERGENCY (EMERGENCY)
Facility: HOSPITAL | Age: 20
LOS: 1 days | End: 2025-07-21
Attending: STUDENT IN AN ORGANIZED HEALTH CARE EDUCATION/TRAINING PROGRAM | Admitting: EMERGENCY MEDICINE
Payer: COMMERCIAL

## 2025-07-21 VITALS
DIASTOLIC BLOOD PRESSURE: 65 MMHG | RESPIRATION RATE: 15 BRPM | SYSTOLIC BLOOD PRESSURE: 108 MMHG | OXYGEN SATURATION: 100 % | HEIGHT: 63 IN | WEIGHT: 106.04 LBS | HEART RATE: 74 BPM | TEMPERATURE: 98 F

## 2025-07-21 PROCEDURE — 72125 CT NECK SPINE W/O DYE: CPT | Mod: 26

## 2025-07-21 PROCEDURE — 99223 1ST HOSP IP/OBS HIGH 75: CPT

## 2025-07-21 RX ORDER — DIAZEPAM 5 MG/1
5 TABLET ORAL EVERY 8 HOURS
Refills: 0 | Status: DISCONTINUED | OUTPATIENT
Start: 2025-07-21 | End: 2025-07-21

## 2025-07-21 RX ORDER — KETOROLAC TROMETHAMINE 30 MG/ML
30 INJECTION, SOLUTION INTRAMUSCULAR; INTRAVENOUS ONCE
Refills: 0 | Status: DISCONTINUED | OUTPATIENT
Start: 2025-07-21 | End: 2025-07-21

## 2025-07-21 RX ORDER — DIAZEPAM 5 MG/1
5 TABLET ORAL ONCE
Refills: 0 | Status: DISCONTINUED | OUTPATIENT
Start: 2025-07-21 | End: 2025-07-21

## 2025-07-21 RX ORDER — ACETAMINOPHEN 500 MG/5ML
650 LIQUID (ML) ORAL EVERY 6 HOURS
Refills: 0 | Status: DISCONTINUED | OUTPATIENT
Start: 2025-07-21 | End: 2025-07-24

## 2025-07-21 RX ORDER — KETOROLAC TROMETHAMINE 30 MG/ML
15 INJECTION, SOLUTION INTRAMUSCULAR; INTRAVENOUS EVERY 6 HOURS
Refills: 0 | Status: DISCONTINUED | OUTPATIENT
Start: 2025-07-21 | End: 2025-07-21

## 2025-07-21 RX ADMIN — KETOROLAC TROMETHAMINE 30 MILLIGRAM(S): 30 INJECTION, SOLUTION INTRAMUSCULAR; INTRAVENOUS at 11:37

## 2025-07-21 RX ADMIN — DIAZEPAM 5 MILLIGRAM(S): 5 TABLET ORAL at 11:38

## 2025-07-21 RX ADMIN — KETOROLAC TROMETHAMINE 15 MILLIGRAM(S): 30 INJECTION, SOLUTION INTRAMUSCULAR; INTRAVENOUS at 21:36

## 2025-07-21 NOTE — ED ADULT NURSE REASSESSMENT NOTE - NS ED NURSE REASSESS COMMENT FT1
20G LAC  IV placed, +blood return, flushes without difficulty. lcomfort measures provided. stretcher set in lowest position, call bell within reach, safety maintained. [phone]

## 2025-07-21 NOTE — ED ADULT NURSE NOTE - NSFALLHARMRISKINTERV_ED_ALL_ED
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
## 2025-07-21 NOTE — ED ADULT NURSE NOTE - MODE OF DISCHARGE
Marianna Pulido is requesting a refill of ALPRAZolam (XANAX) 2 MG tablet for a 30 day supply and would like sent to local pharmacy, [facility].     Ok to leave a detailed message on voice mail Yes  
Refill request for:     ALPRAZolam (XANAX) 2 MG tablet 30 tablet 0 6/8/2020     Sig - Route: Take 1 tablet by mouth nightly as needed for Sleep. - Oral      Last OV:  2/3/20  Next OV:  8/6/20  Last dispensed per PDMP:    Qty: 30  Days: 30  Refills: 0 Prescribed: 6/8/2020  Dispensed: 6/8/2020  Sold: 6/9/2020     
Signed, but post-dated to 7/6  
Ambulatory

## 2025-07-21 NOTE — ED CDU PROVIDER INITIAL DAY NOTE - ATTENDING APP SHARED VISIT CONTRIBUTION OF CARE
I, Charbel Valenzuela, DO personally saw the patient with LAURA.  I have personally performed a face to face diagnostic evaluation on this patient.  I have reviewed the LAURA note and agree with the history, exam, and plan of care, except as noted.  I personally saw the patient and performed a substantive portion of the visit including all aspects of the medical decision making.
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
## 2025-07-21 NOTE — ED CDU PROVIDER INITIAL DAY NOTE - PHYSICAL EXAMINATION
CONSTITUTIONAL: Well-appearing; well-nourished; in no apparent distress;  HEAD: Normocephalic, atraumatic;  NECK/LYMPH: Supple; non-tender;  CARD: Normal S1, S2; no murmurs, rubs, or gallops noted  RESP: Normal chest excursion with respiration; breath sounds clear and equal bilaterally; no wheezes, rhonchi, or rales noted  EXT/MS: no visible deformity. + midline Cspine ttp. soft ccollar in place. moves all extremities; distal pulses are normal, no pedal edema  SKIN: Normal for age and race; warm; dry; good turgor; no apparent lesions or exudate noted  NEURO: Awake, alert, oriented x 3, no gross deficits, CN II-XII grossly intact, no motor or sensory deficit noted  PSYCH: Normal mood; appropriate affect

## 2025-07-21 NOTE — ED ADULT NURSE NOTE - NSFALLRISKFACTORS_ED_ALL_ED
Other Cimzia Counseling:  I discussed with the patient the risks of Cimzia including but not limited to immunosuppression, allergic reactions and infections.  The patient understands that monitoring is required including a PPD at baseline and must alert us or the primary physician if symptoms of infection or other concerning signs are noted.

## 2025-07-22 VITALS
TEMPERATURE: 98 F | HEART RATE: 68 BPM | SYSTOLIC BLOOD PRESSURE: 91 MMHG | RESPIRATION RATE: 18 BRPM | DIASTOLIC BLOOD PRESSURE: 57 MMHG | OXYGEN SATURATION: 99 %

## 2025-07-22 PROCEDURE — 72141 MRI NECK SPINE W/O DYE: CPT | Mod: 26

## 2025-07-22 PROCEDURE — 99239 HOSP IP/OBS DSCHRG MGMT >30: CPT

## 2025-07-22 RX ORDER — MELATONIN 5 MG
3 TABLET ORAL ONCE
Refills: 0 | Status: COMPLETED | OUTPATIENT
Start: 2025-07-22 | End: 2025-07-22

## 2025-07-22 RX ADMIN — Medication 3 MILLIGRAM(S): at 00:21
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
## 2025-07-22 NOTE — ED CDU PROVIDER DISPOSITION NOTE - NSFOLLOWUPINSTRUCTIONS_ED_ALL_ED_FT
You were seen in the emergency room for neck pain, back pain.      You had CT of your spine performed with no vertebral fracture, unremarkable CT scan of the cervical spine.  You had an MRI cervical spine performed showing mild degenerative changes.  Please follow-up with your primary doctor within 2 to 3 days.      If any new, concerning, worsening symptoms return to the emergency room.    Take Motrin 600mg every 8hrs with food for pain    Take Tylenol 650mg 1 tab every 4-6 hours as needed for pain.

## 2025-07-22 NOTE — ED CDU PROVIDER SUBSEQUENT DAY NOTE - PHYSICAL EXAMINATION
EXT+ midline Cspine ttp. soft ccollar in place. moves all extremities; distal pulses are normal, no pedal edema
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
## 2025-07-22 NOTE — ED CDU PROVIDER DISPOSITION NOTE - PATIENT PORTAL LINK FT
You can access the FollowMyHealth Patient Portal offered by [facility] by registering at the following website: [email or web address]. By joining [email or web address]’s FollowMyHealth portal, you will also be able to view your health information using other applications (apps) compatible with our system.